# Patient Record
Sex: MALE | Race: WHITE | Employment: OTHER | URBAN - METROPOLITAN AREA
[De-identification: names, ages, dates, MRNs, and addresses within clinical notes are randomized per-mention and may not be internally consistent; named-entity substitution may affect disease eponyms.]

---

## 2019-01-17 ENCOUNTER — HOSPITAL ENCOUNTER (OUTPATIENT)
Age: 64
Discharge: HOME | End: 2019-01-19

## 2019-01-17 PROCEDURE — 88305 TISSUE EXAM BY PATHOLOGIST: CPT

## 2020-02-07 ENCOUNTER — APPOINTMENT (OUTPATIENT)
Dept: GENERAL RADIOLOGY | Age: 65
End: 2020-02-07
Payer: COMMERCIAL

## 2020-02-07 ENCOUNTER — APPOINTMENT (OUTPATIENT)
Dept: CT IMAGING | Age: 65
End: 2020-02-07
Payer: COMMERCIAL

## 2020-02-07 ENCOUNTER — HOSPITAL ENCOUNTER (EMERGENCY)
Age: 65
Discharge: HOME | End: 2020-02-07
Attending: EMERGENCY MEDICINE
Payer: COMMERCIAL

## 2020-02-07 VITALS
RESPIRATION RATE: 16 BRPM | TEMPERATURE: 97.5 F | OXYGEN SATURATION: 96 % | HEART RATE: 97 BPM | SYSTOLIC BLOOD PRESSURE: 125 MMHG | DIASTOLIC BLOOD PRESSURE: 76 MMHG

## 2020-02-07 LAB
ALBUMIN SERPL-MCNC: 4.2 G/DL (ref 3.5–5.2)
ALP BLD-CCNC: 146 U/L (ref 40–129)
ALT SERPL-CCNC: 25 U/L (ref 0–40)
ANION GAP SERPL CALCULATED.3IONS-SCNC: 11 MMOL/L (ref 7–16)
AST SERPL-CCNC: 17 U/L (ref 0–39)
BASOPHILS ABSOLUTE: 0 E9/L (ref 0–0.2)
BASOPHILS RELATIVE PERCENT: 0.5 % (ref 0–2)
BILIRUB SERPL-MCNC: 0.5 MG/DL (ref 0–1.2)
BUN BLDV-MCNC: 18 MG/DL (ref 8–23)
CALCIUM SERPL-MCNC: 11 MG/DL (ref 8.6–10.2)
CHLORIDE BLD-SCNC: 102 MMOL/L (ref 98–107)
CO2: 24 MMOL/L (ref 22–29)
CREAT SERPL-MCNC: 1.3 MG/DL (ref 0.7–1.2)
EOSINOPHILS ABSOLUTE: 0 E9/L (ref 0.05–0.5)
EOSINOPHILS RELATIVE PERCENT: 0.8 % (ref 0–6)
GFR AFRICAN AMERICAN: >60
GFR NON-AFRICAN AMERICAN: 55 ML/MIN/1.73
GLUCOSE BLD-MCNC: 110 MG/DL (ref 74–99)
HCT VFR BLD CALC: 47.6 % (ref 37–54)
HEMOGLOBIN: 14.8 G/DL (ref 12.5–16.5)
LACTIC ACID: 1.4 MMOL/L (ref 0.5–2.2)
LIPASE: 40 U/L (ref 13–60)
LYMPHOCYTES ABSOLUTE: 0.33 E9/L (ref 1.5–4)
LYMPHOCYTES RELATIVE PERCENT: 3.5 % (ref 20–42)
MCH RBC QN AUTO: 27.6 PG (ref 26–35)
MCHC RBC AUTO-ENTMCNC: 31.1 % (ref 32–34.5)
MCV RBC AUTO: 88.8 FL (ref 80–99.9)
MONOCYTES ABSOLUTE: 0.75 E9/L (ref 0.1–0.95)
MONOCYTES RELATIVE PERCENT: 8.7 % (ref 2–12)
NEUTROPHILS ABSOLUTE: 7.3 E9/L (ref 1.8–7.3)
NEUTROPHILS RELATIVE PERCENT: 87.8 % (ref 43–80)
PDW BLD-RTO: 13.7 FL (ref 11.5–15)
PLATELET # BLD: 351 E9/L (ref 130–450)
PMV BLD AUTO: 9.2 FL (ref 7–12)
POTASSIUM REFLEX MAGNESIUM: 4.5 MMOL/L (ref 3.5–5)
RBC # BLD: 5.36 E12/L (ref 3.8–5.8)
SODIUM BLD-SCNC: 137 MMOL/L (ref 132–146)
TOTAL PROTEIN: 8.1 G/DL (ref 6.4–8.3)
TROPONIN: <0.01 NG/ML (ref 0–0.03)
WBC # BLD: 8.3 E9/L (ref 4.5–11.5)

## 2020-02-07 PROCEDURE — 80053 COMPREHEN METABOLIC PANEL: CPT

## 2020-02-07 PROCEDURE — 84484 ASSAY OF TROPONIN QUANT: CPT

## 2020-02-07 PROCEDURE — 2580000003 HC RX 258: Performed by: EMERGENCY MEDICINE

## 2020-02-07 PROCEDURE — 71250 CT THORAX DX C-: CPT

## 2020-02-07 PROCEDURE — 83690 ASSAY OF LIPASE: CPT

## 2020-02-07 PROCEDURE — 93005 ELECTROCARDIOGRAM TRACING: CPT | Performed by: NURSE PRACTITIONER

## 2020-02-07 PROCEDURE — 71120 X-RAY EXAM BREASTBONE 2/>VWS: CPT

## 2020-02-07 PROCEDURE — 99285 EMERGENCY DEPT VISIT HI MDM: CPT

## 2020-02-07 PROCEDURE — 83605 ASSAY OF LACTIC ACID: CPT

## 2020-02-07 PROCEDURE — 36415 COLL VENOUS BLD VENIPUNCTURE: CPT

## 2020-02-07 PROCEDURE — 74176 CT ABD & PELVIS W/O CONTRAST: CPT

## 2020-02-07 PROCEDURE — 85025 COMPLETE CBC W/AUTO DIFF WBC: CPT

## 2020-02-07 PROCEDURE — 71110 X-RAY EXAM RIBS BIL 3 VIEWS: CPT

## 2020-02-07 PROCEDURE — 74018 RADEX ABDOMEN 1 VIEW: CPT

## 2020-02-07 RX ORDER — 0.9 % SODIUM CHLORIDE 0.9 %
1000 INTRAVENOUS SOLUTION INTRAVENOUS ONCE
Status: COMPLETED | OUTPATIENT
Start: 2020-02-07 | End: 2020-02-07

## 2020-02-07 RX ADMIN — SODIUM CHLORIDE 1000 ML: 9 INJECTION, SOLUTION INTRAVENOUS at 20:35

## 2020-02-07 ASSESSMENT — PAIN SCALES - GENERAL: PAINLEVEL_OUTOF10: 7

## 2020-02-08 LAB
EKG ATRIAL RATE: 106 BPM
EKG P AXIS: 55 DEGREES
EKG P-R INTERVAL: 188 MS
EKG Q-T INTERVAL: 322 MS
EKG QRS DURATION: 88 MS
EKG QTC CALCULATION (BAZETT): 427 MS
EKG R AXIS: 130 DEGREES
EKG T AXIS: 32 DEGREES
EKG VENTRICULAR RATE: 106 BPM

## 2020-02-08 PROCEDURE — 93010 ELECTROCARDIOGRAM REPORT: CPT | Performed by: INTERNAL MEDICINE

## 2020-02-08 NOTE — ED PROVIDER NOTES
0.8 0.0 - 6.0 %    Basophils % 0.5 0.0 - 2.0 %    Neutrophils Absolute 7.30 1.80 - 7.30 E9/L    Lymphocytes Absolute 0.33 (L) 1.50 - 4.00 E9/L    Monocytes Absolute 0.75 0.10 - 0.95 E9/L    Eosinophils Absolute 0.00 (L) 0.05 - 0.50 E9/L    Basophils Absolute 0.00 0.00 - 0.20 E9/L   Troponin   Result Value Ref Range    Troponin <0.01 0.00 - 0.03 ng/mL   Lactic Acid, Plasma   Result Value Ref Range    Lactic Acid 1.4 0.5 - 2.2 mmol/L   Lipase   Result Value Ref Range    Lipase 40 13 - 60 U/L   EKG 12 Lead   Result Value Ref Range    Ventricular Rate 106 BPM    Atrial Rate 106 BPM    P-R Interval 188 ms    QRS Duration 88 ms    Q-T Interval 322 ms    QTc Calculation (Bazett) 427 ms    P Axis 55 degrees    R Axis 130 degrees    T Axis 32 degrees       RADIOLOGY:  Interpreted by Radiologist.  CT Chest WO Contrast   Final Result   1. Areas of linear subsegmental atelectasis in both bases off   undetermined age. 2. Loss of height of several thoracic vertebral bodies of undetermined   age. There is no previous studies available for comparison. 3. Please correlated initially with the clinical examination. See   above comments. CT ABDOMEN PELVIS WO CONTRAST   Final Result   1. No indication for an acute trauma injury to the intraperitoneal   retroperitoneal structures of the abdomen and pelvis. 2. No acute fractures seen in the lumbar spine, pelvic bones including   hip joints and sacral spine. 3.A right inguinal hernia is observed the.      4. Small nonobstructing calculus in the lower pole of the right   kidney. 5. A focus of sclerosis in the L3 vertebral body and in the right   pubic bone. See above comments and recommendations. XR STERNUM (MIN 2 VIEWS)   Final Result   Although cannot see conspicuously an acute displaced   fracture of the sternal there are some limitation the present study.    Can further evaluate with a CT scan of the chest.      XR ABDOMEN (KUB) (SINGLE AP VIEW)

## 2020-11-02 ENCOUNTER — OFFICE VISIT (OUTPATIENT)
Dept: PODIATRY | Age: 65
End: 2020-11-02
Payer: COMMERCIAL

## 2020-11-02 VITALS — WEIGHT: 170 LBS | TEMPERATURE: 98 F | BODY MASS INDEX: 25.76 KG/M2 | HEIGHT: 68 IN

## 2020-11-02 PROBLEM — M79.672 LEFT FOOT PAIN: Status: ACTIVE | Noted: 2020-11-02

## 2020-11-02 PROBLEM — M21.612 BUNION, LEFT FOOT: Status: ACTIVE | Noted: 2020-11-02

## 2020-11-02 PROBLEM — M20.41 HAMMER TOES OF BOTH FEET: Status: ACTIVE | Noted: 2020-11-02

## 2020-11-02 PROBLEM — M20.42 HAMMER TOES OF BOTH FEET: Status: ACTIVE | Noted: 2020-11-02

## 2020-11-02 PROBLEM — I73.9 PVD (PERIPHERAL VASCULAR DISEASE) (HCC): Status: ACTIVE | Noted: 2020-11-02

## 2020-11-02 PROCEDURE — 4040F PNEUMOC VAC/ADMIN/RCVD: CPT | Performed by: PODIATRIST

## 2020-11-02 PROCEDURE — G8427 DOCREV CUR MEDS BY ELIG CLIN: HCPCS | Performed by: PODIATRIST

## 2020-11-02 PROCEDURE — 3017F COLORECTAL CA SCREEN DOC REV: CPT | Performed by: PODIATRIST

## 2020-11-02 PROCEDURE — 1036F TOBACCO NON-USER: CPT | Performed by: PODIATRIST

## 2020-11-02 PROCEDURE — 1123F ACP DISCUSS/DSCN MKR DOCD: CPT | Performed by: PODIATRIST

## 2020-11-02 PROCEDURE — 99203 OFFICE O/P NEW LOW 30 MIN: CPT | Performed by: PODIATRIST

## 2020-11-02 PROCEDURE — G8417 CALC BMI ABV UP PARAM F/U: HCPCS | Performed by: PODIATRIST

## 2020-11-02 PROCEDURE — G8484 FLU IMMUNIZE NO ADMIN: HCPCS | Performed by: PODIATRIST

## 2020-11-02 NOTE — PROGRESS NOTES
Patient is here today for evaluation of left foot pain. He states 1 year ago he had plantar wart laser removal and feels like he is walking on a rock. He c/o left bunion pain and hammer toe 2nd left toe.

## 2020-11-02 NOTE — PROGRESS NOTES
20     Lillie Medellin    : 1955 Sex: male   Age: 77 y.o. Patient was referred by: None  Patient's PCP/Provider is: Leanna Ferrer MD    Subjective:    Patient is seen today for evaluation regarding pain into both feet. Chief Complaint   Patient presents with    Toe Pain    Foot Pain       HPI: Patient has had issues for several years and they have progressively gotten worse. Patient also complaining of some bunion and hammertoe issues to both feet. Patient did have a previous benign neoplasm removed plantar left forefoot region which is causing recurrent symptoms at this time. He denies any recent injury or change in activities. Patient wanted to discuss potential treatment options available at this time. ROS:  Const: Positives and pertinent negatives as per HPI. Musculo: Denies symptoms other than stated above. Neuro: Denies symptoms other than stated above. Skin: Denies symptoms other than stated above. Current Medications:    Current Outpatient Medications:     nortriptyline (PAMELOR) 25 MG capsule, Take 75 mg by mouth nightly, Disp: , Rfl:     citalopram (CELEXA) 20 MG tablet, Take 40 mg by mouth daily, Disp: , Rfl:     Mirabegron ER (MYRBETRIQ) 25 MG TB24, Take 25 mg by mouth daily, Disp: , Rfl:     tamsulosin (FLOMAX) 0.4 MG capsule, Take 0.8 mg by mouth daily, Disp: , Rfl:     baclofen (LIORESAL) 10 MG tablet, Take 10 mg by mouth 2 times daily, Disp: , Rfl:     Allergies: Allergies   Allergen Reactions    Seasonal Other (See Comments)       Vitals:    20 1057   Temp: 98 °F (36.7 °C)   Weight: 170 lb (77.1 kg)   Height: 5' 8\" (1.727 m)        Past Medical History:   Diagnosis Date    Movement disorder     MS (multiple sclerosis) ([facility] [address]      No family history on file. No past surgical history on file.   Social History     Tobacco Use    Smoking status: Never Smoker    Smokeless tobacco: Never Used   Substance Use Topics    Alcohol use: Yes     Comment: social    Drug use: No           Diagnostic studies:    Xr Foot Left (min 3 Views)    Result Date: 11/2/2020  EXAMINATION: THREE XRAY VIEWS OF THE LEFT FOOT 11/2/2020 10:37 am COMPARISON: None. HISTORY: ORDERING SYSTEM PROVIDED HISTORY: Left foot pain TECHNOLOGIST PROVIDED HISTORY: Standing unless patient unable to stand FINDINGS: No acute fracture or dislocation. Bunion at the 1st MTP joint and mild hammertoe deformities at the 2nd through 5th digits. No soft tissue swelling or edema. Chronic changes bunion and hammertoe as described above. No acute abnormality in the left foot. Procedures:    None    Exam:  VASCULAR: Pedal pulses palpable but diminished to palpation bilateral foot. Capillary fill time delayed digits 1 through 5 bilateral foot. Presence of hair growth is diminished to both lower extremities. Coolness noted to palpation digital regions bilateral foot. NEUROLOGICAL: Epicritic sensations intact and symmetrical  DERMATOLOGICAL: Ruborous skin changes noted to both lower extremities. No plantar ulcerations or heel fissuring noted bilateral foot. Mild hyperkeratotic area noted plantar left third MTPJ region without underlying ulceration or infection noted. MUSCULOSKELETAL: Moderate bunion deformity noted left greater than right. Contraction deformities noted lesser digits bilateral foot. Adequate range of motion ankle and subtalar joint noted bilateral lower extremities. Plan Per Assessment  Chely Telles was seen today for toe pain and foot pain. Diagnoses and all orders for this visit:    PVD (peripheral vascular disease) ([facility] [address]  -     VL LOWER EXTREMITY ARTERIAL SEGMENTAL PRESSURES W PPG; Future    Left foot pain  -     XR FOOT LEFT (MIN 3 VIEWS); Future  -     VL LOWER EXTREMITY ARTERIAL SEGMENTAL PRESSURES W PPG; Future    Bunion, left foot    Hammer toes of both feet    MS (multiple sclerosis) ([facility])    Difficulty walking        1. New patient evaluation and management  2.  We

## 2020-11-11 ENCOUNTER — TELEPHONE (OUTPATIENT)
Dept: PODIATRY | Age: 65
End: 2020-11-11

## 2020-11-11 NOTE — TELEPHONE ENCOUNTER
Pt calling has not heard anything regarding test he is suppose to have done. It has been 2 wks.  Please call pt  [phone]

## 2020-11-24 ENCOUNTER — HOSPITAL ENCOUNTER (OUTPATIENT)
Dept: INTERVENTIONAL RADIOLOGY/VASCULAR | Age: 65
Discharge: HOME | End: 2020-11-26
Payer: COMMERCIAL

## 2020-11-24 PROCEDURE — 93923 UPR/LXTR ART STDY 3+ LVLS: CPT

## 2020-12-03 ENCOUNTER — TELEPHONE (OUTPATIENT)
Dept: VASCULAR SURGERY | Age: 65
End: 2020-12-03

## 2020-12-03 ENCOUNTER — OFFICE VISIT (OUTPATIENT)
Dept: PODIATRY | Age: 65
End: 2020-12-03
Payer: COMMERCIAL

## 2020-12-03 VITALS — WEIGHT: 170 LBS | HEIGHT: 68 IN | BODY MASS INDEX: 25.76 KG/M2

## 2020-12-03 PROCEDURE — 1036F TOBACCO NON-USER: CPT | Performed by: PODIATRIST

## 2020-12-03 PROCEDURE — 4040F PNEUMOC VAC/ADMIN/RCVD: CPT | Performed by: PODIATRIST

## 2020-12-03 PROCEDURE — 99213 OFFICE O/P EST LOW 20 MIN: CPT | Performed by: PODIATRIST

## 2020-12-03 PROCEDURE — 1123F ACP DISCUSS/DSCN MKR DOCD: CPT | Performed by: PODIATRIST

## 2020-12-03 PROCEDURE — G8427 DOCREV CUR MEDS BY ELIG CLIN: HCPCS | Performed by: PODIATRIST

## 2020-12-03 PROCEDURE — G8417 CALC BMI ABV UP PARAM F/U: HCPCS | Performed by: PODIATRIST

## 2020-12-03 PROCEDURE — G8484 FLU IMMUNIZE NO ADMIN: HCPCS | Performed by: PODIATRIST

## 2020-12-03 PROCEDURE — 3017F COLORECTAL CA SCREEN DOC REV: CPT | Performed by: PODIATRIST

## 2020-12-03 NOTE — TELEPHONE ENCOUNTER
Received a referral from Dr. Carissa Gomez for PVD, left message for patient to schedule appointment with Dr. Shanique Parker.

## 2020-12-03 NOTE — PROGRESS NOTES
12/3/20     Lalit Gonzales    : 1955   Sex: male    Age: 77 y.o. Patient's PCP/Provider is: Cole Perez MD    Subjective:  Patient is seen today for follow-up regarding continued treatment regarding pain into his left foot and digital regions. Patient presents today to discuss his vascular results. Patient still having issues with certain activities into his left foot and ankle region. He denies any recent injury or change in activities. No other additional abnormalities noted. Chief Complaint   Patient presents with    Follow-up     Vascular results        ROS:  Const: Positives and pertinent negatives as per HPI. Musculo: Denies symptoms other than stated above. Neuro: Denies symptoms other than stated above. Skin: Denies symptoms other than stated above. Current Medications:    Current Outpatient Medications:     nortriptyline (PAMELOR) 25 MG capsule, Take 75 mg by mouth nightly, Disp: , Rfl:     citalopram (CELEXA) 20 MG tablet, Take 40 mg by mouth daily, Disp: , Rfl:     Mirabegron ER (MYRBETRIQ) 25 MG TB24, Take 25 mg by mouth daily, Disp: , Rfl:     tamsulosin (FLOMAX) 0.4 MG capsule, Take 0.8 mg by mouth daily, Disp: , Rfl:     baclofen (LIORESAL) 10 MG tablet, Take 10 mg by mouth 2 times daily, Disp: , Rfl:     Allergies: Allergies   Allergen Reactions    Seasonal Other (See Comments)       Vitals:    20 1021   Weight: 170 lb (77.1 kg)   Height: 5' 8\" (1.727 m)       Exam:  Neurovascular status unchanged. Tenderness still noted into the lesser digital regions left foot with attempted range of motion and muscle testing performed. Mild dependent ruborous changes noted to the left foot. No ulcerations or any signs of infection noted left foot. No maceration the webspaces noted left foot. The motion of the lesser digits does elicit discomfort into the digital and forefoot regions left.       Diagnostic Studies:     Vl Lower Extremity Arterial Segmental Pressures W Ppg    Result Date: 11/24/2020  EXAMINATION: ARTERIAL DUPLEX ULTRASOUND OF THE BILATERAL LOWER EXTREMITIES WITH SEGMENTAL PRESSURES AND PULSE VOLUME RECORDINGS 11/24/2020 1:39 pm TECHNIQUE: Arterial duplex IRAM ultrasound. Segmental pressures and PVR performed with Doppler. COMPARISON: None. HISTORY: ORDERING SYSTEM PROVIDED HISTORY: PVD (peripheral vascular disease) ([facility] [address] TECHNOLOGIST PROVIDED HISTORY: Reason for exam:->Peripheral vascular disease What reading provider will be dictating this exam?->CRC FINDINGS: The IRAM on the right is 1.1. The IRAM on the left is 1.1. Doppler waveforms are triphasic throughout both lower extremities. PVR waveforms or within the arm limits throughout the bilateral thighs and calves. Diminutive PVR waveforms are noted at the left 3rd through 5th digits. No evidence of significant stenosis in the major arteries of the lower extremities. Findings suggestive of microvascular disease in the left foot. Procedures:    None    Plan Per Assessment  Pavan Garcia was seen today for follow-up. Diagnoses and all orders for this visit:    PVD (peripheral vascular disease) ([facility] [address]  -     Ambulatory referral to Vascular Surgery    Pain in both lower extremities  -     Ambulatory referral to Vascular Surgery    Hammer toes of both feet    Bunion, left foot    MS (multiple sclerosis) ([facility] [address]      1. Evaluation and management  2. Did review the vascular studies with patient in detail today, and due to the abnormal study results we did recommend vascular consultation. Patient was in favor of this course of treatment. 3. We did discuss appropriate shoe gear to wear at all times to prevent irritative changes to the digital and forefoot regions bilaterally. 4. Patient will be followed up at a later date after his vascular consultation is performed to discuss additional treatment options regarding his Podiatry concerns.   He was advised to call the office with any questions or concerns in the interim. Seen By:    Gabby Mccarty DPM    Electronically signed by Gabby Mccarty DPM on 12/3/2020 at 2:01 PM    This note was created using voice recognition software. The note was reviewed however may contain grammatical errors.

## 2020-12-21 ENCOUNTER — OFFICE VISIT (OUTPATIENT)
Dept: VASCULAR SURGERY | Age: 65
End: 2020-12-21
Payer: COMMERCIAL

## 2020-12-21 VITALS
SYSTOLIC BLOOD PRESSURE: 110 MMHG | WEIGHT: 160 LBS | BODY MASS INDEX: 24.25 KG/M2 | RESPIRATION RATE: 16 BRPM | DIASTOLIC BLOOD PRESSURE: 60 MMHG | HEIGHT: 68 IN

## 2020-12-21 PROCEDURE — 99203 OFFICE O/P NEW LOW 30 MIN: CPT | Performed by: SURGERY

## 2020-12-21 PROCEDURE — 1123F ACP DISCUSS/DSCN MKR DOCD: CPT | Performed by: SURGERY

## 2020-12-21 PROCEDURE — G8420 CALC BMI NORM PARAMETERS: HCPCS | Performed by: SURGERY

## 2020-12-21 PROCEDURE — G8484 FLU IMMUNIZE NO ADMIN: HCPCS | Performed by: SURGERY

## 2020-12-21 PROCEDURE — 3017F COLORECTAL CA SCREEN DOC REV: CPT | Performed by: SURGERY

## 2020-12-21 PROCEDURE — 4040F PNEUMOC VAC/ADMIN/RCVD: CPT | Performed by: SURGERY

## 2020-12-21 PROCEDURE — G8427 DOCREV CUR MEDS BY ELIG CLIN: HCPCS | Performed by: SURGERY

## 2020-12-21 PROCEDURE — 1036F TOBACCO NON-USER: CPT | Performed by: SURGERY

## 2020-12-21 RX ORDER — DIMETHYL FUMARATE 240 MG/1
240 CAPSULE ORAL 2 TIMES DAILY
COMMUNITY

## 2020-12-21 RX ORDER — ASPIRIN 81 MG/1
81 TABLET ORAL DAILY
COMMUNITY

## 2020-12-21 NOTE — PATIENT INSTRUCTIONS
Patient Education        Learning About Peripheral Arterial Disease of the Legs  What is peripheral arterial disease? Peripheral arterial disease (PAD) is narrowing or blockage of arteries in your arms and legs. The most common cause of PAD is the buildup of plaque on the inside of arteries. Plaque is made of extra cholesterol, calcium, and other material in your blood. Over time, plaque builds up in the walls of the arteries, including those that supply blood to your legs. This buildup leads to poor blood flow. When you have PAD, you have a risk of having plaque in other arteries in your body. This raises your risk of a heart attack and stroke. This information focuses on peripheral arterial disease of the legs, the area where it is most common. When you have PAD of the legs and you walk or exercise, your leg muscles may not get enough blood. This may cause symptoms, such as leg pain during exercise. Peripheral arterial disease is also called peripheral vascular disease. What are the symptoms? Many people who have PAD do not have any symptoms. But if you have symptoms, you may have weak or tired legs, difficulty walking or balancing, or pain. If you have pain, you might feel a tight, aching, or squeezing pain in the calf, thigh, or buttock. This pain usually happens after you have walked a certain distance. The pain goes away if you stop walking. This pain is called intermittent claudication. If PAD gets worse, you may have other symptoms that are caused by poor blood flow to your legs and feet. You may have:  · Cold or numb feet or toes. · Sores that are slow to heal.  · Leg or foot pain while you are at rest.  · Feet and toes that become pale from exercise or when elevated. · Feet that turn red when dangled. · Blue or purple marks on your legs, feet, or toes. How can you prevent PAD? · Quit smoking. Quitting smoking is one of the best things you can do to help prevent PAD.  If you need help quitting, talk to your doctor about stop-smoking programs and medicines. These can increase your chances of quitting for good. · Stay at a healthy weight. · Manage other health problems, including diabetes, high blood pressure, and high cholesterol. · Be physically active. Get at least 30 minutes of exercise on most days of the week. Walking is a good choice. You also may want to do other activities, such as running, swimming, cycling, or playing tennis or team sports. · Eat a variety of heart-healthy foods. ? Eat fruits, vegetables, whole grains (like oatmeal), dried beans and peas, nuts and seeds, soy products (like tofu), and fat-free or low-fat dairy products. ? Replace butter, margarine, and hydrogenated or partially hydrogenated oils with olive and canola oils. (Canola oil margarine without trans fat is fine.)  ? Replace red meat with fish, poultry, and soy protein (like tofu). ? Limit processed and packaged foods like chips, crackers, and cookies. How is PAD treated? Your doctor may suggest ways to relieve symptoms and lower your risk of heart attack and stroke. These may include:  · Quitting smoking. It's one of the most important things you can do. If you need help quitting, talk to your doctor about stop-smoking programs and medicines. These can increase your chances of quitting for good. · Eating heart-healthy foods. · Staying at a healthy weight. Lose weight if you need to. · Regular exercise (if your doctor says it's safe). Try walking, swimming, or biking for at least 30 minutes on most, if not all, days of the week. If you have leg symptoms when you exercise, your doctor might recommend a specialized exercise program that may relieve symptoms. The goal is to be able to walk farther without pain. · Medicines that help manage other problems such as high blood pressure and high cholesterol.   · Medicine, such as aspirin, that prevents blood clots which could cause a heart attack or stroke. · Procedures, such as opening narrowed or blocked arteries (angioplasty) or using healthy blood vessels to create detours around narrowed or blocked arteries (bypass surgery). Follow-up care is a key part of your treatment and safety. Be sure to make and go to all appointments, and call your doctor if you are having problems. It's also a good idea to know your test results and keep a list of the medicines you take. Where can you learn more? Go to https://Lekiosque.fr.Jobmetoo. org and sign in to your [employer] account. Enter D193 in the Empressr box to learn more about \"Learning About Peripheral Arterial Disease of the Legs. \"     If you do not have an account, please click on the \"Sign Up Now\" link. Current as of: December 16, 2019               Content Version: 12.6  © 8342-9338 [employer]. Care instructions adapted under license by [facility] ([facility]). If you have questions about a medical condition or this instruction, always ask your healthcare professional. [place] [address] any warranty or liability for your use of this information.

## 2020-12-21 NOTE — PROGRESS NOTES
Vascular Surgery Outpatient Consultation    Reason for Consult:  pvd    PCP : Gaudencio Atkinson MD  Podiatrist : Dr. Tyler Moore:    The patient is a 77 y.o. male who states is here in regards to pvd. There are plans in the future for left first toe surgery. He has been having problems with balance because of the bunion. Currently he is not having pain. He has significant issues with numbness and tingling in bilateral LE. He has chronic issues due to his MS. He denies lifestyle limiting claudication, rest pain or tissue loss. ROS : All others Negative if blank [], Positive if [x]  General Urinary   [] Fevers [] Hematuria   [] Chills [] Dysuria   [] Weight Loss Vascular   Skin [] Claudication   [] Tissue Loss [] Rest Pain   Eyes Neurologic   [x] Wears Glasses/Contacts [] Stroke/TIA   [] Vision Changes [] Focal weakness   Respiratory [] Slurred Speech    [] Shortness of breath ENT   Cardiovascular [] Difficulty swallowing   [] Chest Pain Endocrine    [] Shortness of breath with exertion [] Increased Thirst   Gastrointestinal    [] Abdominal Pain    [] Melena   [] Hematochezia         Past Medical History:        Diagnosis Date    Bunion     Movement disorder     MS (multiple sclerosis) ([facility] [address]      Past Surgical History:        Procedure Laterality Date    EYE SURGERY      glucoma    PROSTATE SURGERY       Current Medications:   Current Outpatient Medications   Medication Sig Dispense Refill    dimethyl fumarate (TECFIDERA) 240 MG delayed release capsule Take 240 mg by mouth 2 times daily      aspirin 81 MG EC tablet Take 81 mg by mouth daily      nortriptyline (PAMELOR) 25 MG capsule Take 75 mg by mouth nightly      citalopram (CELEXA) 20 MG tablet Take 40 mg by mouth daily       No current facility-administered medications for this visit.       Allergies:  Seasonal  Social History     Socioeconomic History    Marital status:      Spouse name: Not on file  Number of children: Not on file    Years of education: Not on file    Highest education level: Not on file   Occupational History    Not on file   Social Needs    Financial resource strain: Not on file    Food insecurity     Worry: Not on file     Inability: Not on file    Transportation needs     Medical: Not on file     Non-medical: Not on file   Tobacco Use    Smoking status: Never Smoker    Smokeless tobacco: Never Used   Substance and Sexual Activity    Alcohol use: Yes     Comment: social    Drug use: No    Sexual activity: Not on file   Lifestyle    Physical activity     Days per week: Not on file     Minutes per session: Not on file    Stress: Not on file   Relationships    Social connections     Talks on phone: Not on file     Gets together: Not on file     Attends [employer] service: Not on file     Active member of club or organization: Not on file     Attends meetings of clubs or organizations: Not on file     Relationship status: Not on file    Intimate partner violence     Fear of current or ex partner: Not on file     Emotionally abused: Not on file     Physically abused: Not on file     Forced sexual activity: Not on file   Other Topics Concern    Not on file   Social History Narrative    Not on file     Family Hx  Denies hx of PVD    Labs  Lab Results   Component Value Date    WBC 8.3 02/07/2020    HGB 14.8 02/07/2020    HCT 47.6 02/07/2020     02/07/2020    PROTIME 12.2 12/18/2016    INR 1.1 12/18/2016    K 4.5 02/07/2020    BUN 18 02/07/2020    CREATININE 1.3 (H) 02/07/2020     PHYSICAL EXAM:    /60   Resp 16   Ht 5' 8\" (1.727 m)   Wt 160 lb (72.6 kg)   BMI 24.33 kg/m²   CONSTITUTIONAL:   Awake, alert, cooperative  PSYCHIATRIC :  Oriented to time, place and person     Appropriate insight to disease process  EYES: Lids and lashes normal  ENT:  External ears and nose without lesions   Hearing deficits not noted  NECK: Supple, symmetrical, trachea midline   Thyroid goiter not appreciated   Carotid bruit notnoted  LUNGS:  No increased work of breathing                 Clear to auscultation  CARDIOVASCULAR:  regular rate and rhythm   ABDOMEN:  soft, non-distended, non-tender   Hernias umbilical, non tender, no skin changes, reducible   Aorta is not palpable  Lymphatics : Cervical lymphadenopathy notnotnoed     Femoral lymphadenopathy notnoted  SKIN:   Normal skin color   Texture and turgor normal, no induration  EXTREMITIES:   R UE 5/5 strength   No cyanosis noted in nail beds  L UE 5/5 strength   No cyanosis noted in nail beds  R LE Edema absent   No open wounds  L LE Edema absent  No open wounds  R femoral 2+ L femoral 2+   R dorsalis pedis 2+ L dorsalis pedis 2+   R posterior tibial biphasic L posterior tibial 1+     RADIOLOGY:  PIs and PVRs  Right IRAM 1.13, TBI noncompressible  Left IRAM 1.09, TBI noncompressible      A/P Assx PVD  · He has very mild at worst PVD -mostly in the tibial distribution  · His PVD is asymptomatic  · He does have diminished toe pressures on the left side and the third fourth and fifth toe but normal toe pressures in the first toe  · He has adequate flow for healing -okay to go forward with podiatric intervention from a vascular point of view  · Continue Medical management with [facility]SA  · Discussed with pt tobacco use and significant relationship to PVD    pt currently is not a smoker   Pt understands tobacco uses potential to cause increased problems post intervention, future worsening of disease, and even limb loss  · Walking Program  · Emphasized importance of foot care  · They understood to call if develops any wounds or ulcerations, changes in  appearance or symptoms  · If any issues with healing Dr. Shahid Yo will contact me    Rebecca Serra

## 2020-12-22 ENCOUNTER — OFFICE VISIT (OUTPATIENT)
Dept: PODIATRY | Age: 65
End: 2020-12-22
Payer: COMMERCIAL

## 2020-12-22 VITALS — WEIGHT: 160 LBS | HEIGHT: 68 IN | BODY MASS INDEX: 24.25 KG/M2

## 2020-12-22 PROCEDURE — 1036F TOBACCO NON-USER: CPT | Performed by: PODIATRIST

## 2020-12-22 PROCEDURE — 99213 OFFICE O/P EST LOW 20 MIN: CPT | Performed by: PODIATRIST

## 2020-12-22 PROCEDURE — G8420 CALC BMI NORM PARAMETERS: HCPCS | Performed by: PODIATRIST

## 2020-12-22 PROCEDURE — G8484 FLU IMMUNIZE NO ADMIN: HCPCS | Performed by: PODIATRIST

## 2020-12-22 PROCEDURE — 3017F COLORECTAL CA SCREEN DOC REV: CPT | Performed by: PODIATRIST

## 2020-12-22 PROCEDURE — 1123F ACP DISCUSS/DSCN MKR DOCD: CPT | Performed by: PODIATRIST

## 2020-12-22 PROCEDURE — 4040F PNEUMOC VAC/ADMIN/RCVD: CPT | Performed by: PODIATRIST

## 2020-12-22 PROCEDURE — G8427 DOCREV CUR MEDS BY ELIG CLIN: HCPCS | Performed by: PODIATRIST

## 2020-12-22 NOTE — PROGRESS NOTES
Patient is in today to discuss surgery options after being cleared by Dr. Meg Salamanca is Tarsha Carlson MD

## 2020-12-23 NOTE — PROGRESS NOTES
20     Cerdick Stain    : 1955   Sex: male    Age: 77 y.o. Patient's PCP/Provider is: Rosemary Mobley MD    Subjective:  Patient is seen today for follow-up regarding continued evaluation regarding bunion and hammertoe issues left foot. Patient did have his vascular consultation and was noted to have adequate flow to both lower extremities. Patient wanted to discuss continued treatment options regarding his hammertoe and bunion issues left. No other additional abnormalities noted at this time. Chief Complaint   Patient presents with    Follow-up     discuss surgery        ROS:  Const: Positives and pertinent negatives as per HPI. Musculo: Denies symptoms other than stated above. Neuro: Denies symptoms other than stated above. Skin: Denies symptoms other than stated above. Current Medications:    Current Outpatient Medications:     dimethyl fumarate (TECFIDERA) 240 MG delayed release capsule, Take 240 mg by mouth 2 times daily, Disp: , Rfl:     aspirin 81 MG EC tablet, Take 81 mg by mouth daily, Disp: , Rfl:     nortriptyline (PAMELOR) 25 MG capsule, Take 75 mg by mouth nightly, Disp: , Rfl:     citalopram (CELEXA) 20 MG tablet, Take 40 mg by mouth daily, Disp: , Rfl:     Allergies: Allergies   Allergen Reactions    Seasonal Other (See Comments)       Vitals:    20 1308   Weight: 160 lb (72.6 kg)   Height: 5' 8\" (1.727 m)       Exam:  Neurovascular status unchanged. Bunion issue noted which is fixed in nature. Hammertoe contraction deformities noted which are rigid in nature left foot secondary to patient's MS issues. No significant hyperkeratotic areas noted to the digital regions or plantar ball of the foot regions left foot. Antalgic gait noted upon evaluation.       Diagnostic Studies:     Vl Lower Extremity Arterial Segmental Pressures W Ppg    Result Date: 2020  EXAMINATION: ARTERIAL DUPLEX ULTRASOUND OF THE BILATERAL LOWER EXTREMITIES WITH SEGMENTAL PRESSURES AND PULSE VOLUME RECORDINGS 11/24/2020 1:39 pm TECHNIQUE: Arterial duplex IRAM ultrasound. Segmental pressures and PVR performed with Doppler. COMPARISON: None. HISTORY: ORDERING SYSTEM PROVIDED HISTORY: PVD (peripheral vascular disease) ([facility] [address] TECHNOLOGIST PROVIDED HISTORY: Reason for exam:->Peripheral vascular disease What reading provider will be dictating this exam?->CRC FINDINGS: The IRAM on the right is 1.1. The IRAM on the left is 1.1. Doppler waveforms are triphasic throughout both lower extremities. PVR waveforms or within the arm limits throughout the bilateral thighs and calves. Diminutive PVR waveforms are noted at the left 3rd through 5th digits. No evidence of significant stenosis in the major arteries of the lower extremities. Findings suggestive of microvascular disease in the left foot. Procedures:    None    Plan Per Assessment  Jimenes Ro was seen today for follow-up. Diagnoses and all orders for this visit:    Joey Friend, left foot  -     Amb External Referral For Orthotics    Hammer toes of both feet  -     Amb External Referral For Orthotics    Left foot pain  -     Amb External Referral For Orthotics    MS (multiple sclerosis) ([facility] [address]    Difficulty walking  -     Amb External Referral For Orthotics      1. Evaluation and management  2. We did review previous x-rays and did discuss vascular consultation results. 3. We did recommend conservative care at this time with use of a custom orthotic to help control the bunion and hammertoe issues left foot. Patient was in agreement with this course of care. 4. Patient will be followed up in 6 weeks time or sooner if needed for reevaluation. He was advised to call the office with any questions or concerns in the interim. Seen By:    Lisa Vásquez DPM    Electronically signed by Lisa Vásquez DPM on 12/23/2020 at 3:27 PM    This note was created using voice recognition software.   The note was reviewed however may

## 2021-02-11 ENCOUNTER — OFFICE VISIT (OUTPATIENT)
Dept: PODIATRY | Age: 66
End: 2021-02-11
Payer: COMMERCIAL

## 2021-02-11 VITALS — HEIGHT: 68 IN | WEIGHT: 160 LBS | BODY MASS INDEX: 24.25 KG/M2

## 2021-02-11 DIAGNOSIS — M20.41 HAMMER TOES OF BOTH FEET: ICD-10-CM

## 2021-02-11 DIAGNOSIS — R26.2 DIFFICULTY WALKING: ICD-10-CM

## 2021-02-11 DIAGNOSIS — G35 MS (MULTIPLE SCLEROSIS) (HCC): Chronic | ICD-10-CM

## 2021-02-11 DIAGNOSIS — M20.42 HAMMER TOES OF BOTH FEET: ICD-10-CM

## 2021-02-11 DIAGNOSIS — M21.612 BUNION, LEFT FOOT: Primary | ICD-10-CM

## 2021-02-11 PROCEDURE — G8484 FLU IMMUNIZE NO ADMIN: HCPCS | Performed by: PODIATRIST

## 2021-02-11 PROCEDURE — 3017F COLORECTAL CA SCREEN DOC REV: CPT | Performed by: PODIATRIST

## 2021-02-11 PROCEDURE — G8427 DOCREV CUR MEDS BY ELIG CLIN: HCPCS | Performed by: PODIATRIST

## 2021-02-11 PROCEDURE — G8420 CALC BMI NORM PARAMETERS: HCPCS | Performed by: PODIATRIST

## 2021-02-11 PROCEDURE — 1123F ACP DISCUSS/DSCN MKR DOCD: CPT | Performed by: PODIATRIST

## 2021-02-11 PROCEDURE — 99213 OFFICE O/P EST LOW 20 MIN: CPT | Performed by: PODIATRIST

## 2021-02-11 PROCEDURE — 1036F TOBACCO NON-USER: CPT | Performed by: PODIATRIST

## 2021-02-11 PROCEDURE — 4040F PNEUMOC VAC/ADMIN/RCVD: CPT | Performed by: PODIATRIST

## 2021-02-11 NOTE — PROGRESS NOTES
21     Otf Martinez    : 1955   Sex: male    Age: 77 y.o. Patient's PCP/Provider is: Eloisa Mccabe MD    Subjective:  Patient is seen today for follow-up regarding continued evaluation regarding bunion issue and hammertoe issue left foot. Patient just recently did  his custom foot orthoses which she has been wearing over the last 2 weeks. Patient stated he does not wear his tennis shoes all the time and wanted to discuss other potential treatment options that he might be able to wear at home instead of his slippers. He denies any additional issues at this time. Chief Complaint   Patient presents with    Follow-up     bilateral orthotic insoles       ROS:  Const: Positives and pertinent negatives as per HPI. Musculo: Denies symptoms other than stated above. Neuro: Denies symptoms other than stated above. Skin: Denies symptoms other than stated above. Current Medications:    Current Outpatient Medications:     dimethyl fumarate (TECFIDERA) 240 MG delayed release capsule, Take 240 mg by mouth 2 times daily, Disp: , Rfl:     aspirin 81 MG EC tablet, Take 81 mg by mouth daily, Disp: , Rfl:     nortriptyline (PAMELOR) 25 MG capsule, Take 75 mg by mouth nightly, Disp: , Rfl:     citalopram (CELEXA) 20 MG tablet, Take 40 mg by mouth daily, Disp: , Rfl:     Allergies: Allergies   Allergen Reactions    Seasonal Other (See Comments)       Vitals:    21 1152   Weight: 160 lb (72.6 kg)   Height: 5' 8\" (1.727 m)       Exam:  Neurovascular status unchanged. Bunion and hammertoe sites left foot are stable at this time. No plantar calluses noted left ball of the foot region. No hyperkeratotic areas noted lesser digits PIPJ regions left foot. Minimal tenderness noted into the ball of the foot regions left foot. Mild antalgic gait noted left foot. Diagnostic Studies:     No results found.       Procedures:    None     Plan Per Assessment  Anastasia Bruno was seen today for follow-up. Diagnoses and all orders for this visit:    Lina Holland, left foot    Hammer toes of both feet    MS (multiple sclerosis) ([address]    Difficulty walking      1. Evaluation and management  2. We did discuss and evaluated patient's current foot orthoses. We did recommend purchasing new supportive shoe gear to accommodate the insoles and relieve current issues. 3. We did give patient information on purchasing OTC orthopedic sandals to be worn at home to give him added support and protection into the ball of the foot regions bilaterally. Patient was advised on daily exercise regimen and strengthening and stretching exercises to reduce symptoms as well. 4. Patient will be followed up in 2 months time or sooner if needed for reevaluation. He was advised to call the office with any questions or concerns in the interim. Seen By:    Edgardo Kinsey DPM    Electronically signed by Edgardo Kinsey DPM on 2/11/2021 at 12:03 PM    This note was created using voice recognition software. The note was reviewed however may contain grammatical errors.

## 2021-02-11 NOTE — PROGRESS NOTES
Patient is here for follow up with orthotic insoles. Patient has a few questions about how long he should wear them.  Last ov with Mary Jc MD 12/25/2020    Electronically signed by Mary Alice Hameed LPN on 1/76/5647 at 34:99 AM

## 2021-02-22 ENCOUNTER — TELEPHONE (OUTPATIENT)
Dept: PODIATRY | Age: 66
End: 2021-02-22

## 2021-02-22 NOTE — TELEPHONE ENCOUNTER
Spoke with patient and guided him through the [employer] website in order to purchase in pinnacle plus insoles.         Electronically signed by Eugene Gibbs MA on 2/22/2021 at 10:21 AM

## 2021-02-22 NOTE — TELEPHONE ENCOUNTER
Orquidea Cruz called in to state he cannot find the correct pennicle series on [employer] online and would like some one to walk him through it. He stated Zainab Campos showed him in the office on the computer and he cant find that.  Best contact [phone]

## 2021-05-18 ENCOUNTER — OFFICE VISIT (OUTPATIENT)
Dept: PODIATRY | Age: 66
End: 2021-05-18
Payer: COMMERCIAL

## 2021-05-18 VITALS — HEIGHT: 68 IN | BODY MASS INDEX: 24.25 KG/M2 | WEIGHT: 160 LBS

## 2021-05-18 DIAGNOSIS — M20.42 HAMMER TOES OF BOTH FEET: ICD-10-CM

## 2021-05-18 DIAGNOSIS — M21.612 BUNION, LEFT FOOT: Primary | ICD-10-CM

## 2021-05-18 DIAGNOSIS — M20.41 HAMMER TOES OF BOTH FEET: ICD-10-CM

## 2021-05-18 DIAGNOSIS — R26.2 DIFFICULTY WALKING: ICD-10-CM

## 2021-05-18 DIAGNOSIS — G35 MS (MULTIPLE SCLEROSIS) (HCC): Chronic | ICD-10-CM

## 2021-05-18 PROCEDURE — 1123F ACP DISCUSS/DSCN MKR DOCD: CPT | Performed by: PODIATRIST

## 2021-05-18 PROCEDURE — G8420 CALC BMI NORM PARAMETERS: HCPCS | Performed by: PODIATRIST

## 2021-05-18 PROCEDURE — 3017F COLORECTAL CA SCREEN DOC REV: CPT | Performed by: PODIATRIST

## 2021-05-18 PROCEDURE — 4040F PNEUMOC VAC/ADMIN/RCVD: CPT | Performed by: PODIATRIST

## 2021-05-18 PROCEDURE — 99213 OFFICE O/P EST LOW 20 MIN: CPT | Performed by: PODIATRIST

## 2021-05-18 PROCEDURE — 1036F TOBACCO NON-USER: CPT | Performed by: PODIATRIST

## 2021-05-18 PROCEDURE — G8427 DOCREV CUR MEDS BY ELIG CLIN: HCPCS | Performed by: PODIATRIST

## 2021-05-19 NOTE — PROGRESS NOTES
Patient is in today for 2 month follow up. Patient does not notice any change since receiving his orthotics. Patient is not having any pain at this time.  pcp is Inessa Dow MD  Last ov 3/17/21
insoles. 3. Patient was advised daily stretching strengthening exercises to prevent worsening of his digital and bunion issues. 4. Patient will be followed up at a later date for continued evaluation and care. Seen By:    Niesha William DPM    Electronically signed by Niesha William DPM on 5/18/2021 at 9:24 PM    This note was created using voice recognition software. The note was reviewed however may contain grammatical errors.

## 2024-02-05 LAB
ALBUMIN: NORMAL
ALP BLD-CCNC: NORMAL U/L
ALT SERPL-CCNC: NORMAL U/L
ANION GAP SERPL CALCULATED.3IONS-SCNC: NORMAL MMOL/L
AST SERPL-CCNC: NORMAL U/L
BASOPHILS ABSOLUTE: NORMAL
BASOPHILS RELATIVE PERCENT: NORMAL
BILIRUB SERPL-MCNC: NORMAL MG/DL
BUN BLDV-MCNC: NORMAL MG/DL
CALCIUM SERPL-MCNC: NORMAL MG/DL
CHLORIDE BLD-SCNC: NORMAL MMOL/L
CHOLESTEROL, TOTAL: NORMAL
CHOLESTEROL/HDL RATIO: NORMAL
CO2: NORMAL
CREAT SERPL-MCNC: NORMAL MG/DL
EOSINOPHILS ABSOLUTE: NORMAL
EOSINOPHILS RELATIVE PERCENT: NORMAL
GFR, ESTIMATED: NORMAL
GLUCOSE BLD-MCNC: NORMAL MG/DL
HCT VFR BLD CALC: NORMAL %
HDLC SERPL-MCNC: NORMAL MG/DL
HEMOGLOBIN: NORMAL
LDL CHOLESTEROL: NORMAL
LYMPHOCYTES ABSOLUTE: NORMAL
LYMPHOCYTES RELATIVE PERCENT: NORMAL
MCH RBC QN AUTO: NORMAL PG
MCHC RBC AUTO-ENTMCNC: NORMAL G/DL
MCV RBC AUTO: NORMAL FL
MONOCYTES ABSOLUTE: NORMAL
MONOCYTES RELATIVE PERCENT: NORMAL
NEUTROPHILS ABSOLUTE: NORMAL
NEUTROPHILS RELATIVE PERCENT: NORMAL
NONHDLC SERPL-MCNC: NORMAL MG/DL
PLATELET # BLD: NORMAL 10*3/UL
PMV BLD AUTO: NORMAL FL
POTASSIUM SERPL-SCNC: NORMAL MMOL/L
PROSTATE SPECIFIC ANTIGEN: NORMAL
RBC # BLD: NORMAL 10*6/UL
SODIUM BLD-SCNC: NORMAL MMOL/L
TOTAL PROTEIN: NORMAL
TRIGL SERPL-MCNC: NORMAL MG/DL
VLDLC SERPL CALC-MCNC: NORMAL MG/DL
WBC # BLD: NORMAL 10*3/UL

## 2025-02-25 ENCOUNTER — OFFICE VISIT (OUTPATIENT)
Age: 70
End: 2025-02-25

## 2025-02-25 VITALS
WEIGHT: 177 LBS | SYSTOLIC BLOOD PRESSURE: 128 MMHG | OXYGEN SATURATION: 99 % | TEMPERATURE: 97.7 F | HEIGHT: 68 IN | RESPIRATION RATE: 18 BRPM | BODY MASS INDEX: 26.83 KG/M2 | DIASTOLIC BLOOD PRESSURE: 78 MMHG | HEART RATE: 75 BPM

## 2025-02-25 DIAGNOSIS — Z12.5 PROSTATE CANCER SCREENING: ICD-10-CM

## 2025-02-25 DIAGNOSIS — Z00.00 MEDICARE ANNUAL WELLNESS VISIT, SUBSEQUENT: Primary | ICD-10-CM

## 2025-02-25 DIAGNOSIS — E78.2 MIXED HYPERLIPIDEMIA: ICD-10-CM

## 2025-02-25 DIAGNOSIS — G35 MS (MULTIPLE SCLEROSIS) (HCC): ICD-10-CM

## 2025-02-25 DIAGNOSIS — Z12.11 COLON CANCER SCREENING: ICD-10-CM

## 2025-02-25 RX ORDER — OXYBUTYNIN CHLORIDE 5 MG/1
5 TABLET ORAL DAILY
COMMUNITY

## 2025-02-25 SDOH — ECONOMIC STABILITY: FOOD INSECURITY: WITHIN THE PAST 12 MONTHS, THE FOOD YOU BOUGHT JUST DIDN'T LAST AND YOU DIDN'T HAVE MONEY TO GET MORE.: NEVER TRUE

## 2025-02-25 SDOH — ECONOMIC STABILITY: FOOD INSECURITY: WITHIN THE PAST 12 MONTHS, YOU WORRIED THAT YOUR FOOD WOULD RUN OUT BEFORE YOU GOT MONEY TO BUY MORE.: NEVER TRUE

## 2025-02-25 ASSESSMENT — PATIENT HEALTH QUESTIONNAIRE - PHQ9
SUM OF ALL RESPONSES TO PHQ QUESTIONS 1-9: 0
2. FEELING DOWN, DEPRESSED OR HOPELESS: NOT AT ALL
SUM OF ALL RESPONSES TO PHQ QUESTIONS 1-9: 0
SUM OF ALL RESPONSES TO PHQ9 QUESTIONS 1 & 2: 0
SUM OF ALL RESPONSES TO PHQ QUESTIONS 1-9: 0
1. LITTLE INTEREST OR PLEASURE IN DOING THINGS: NOT AT ALL
SUM OF ALL RESPONSES TO PHQ QUESTIONS 1-9: 0

## 2025-02-25 ASSESSMENT — LIFESTYLE VARIABLES
HOW MANY STANDARD DRINKS CONTAINING ALCOHOL DO YOU HAVE ON A TYPICAL DAY: PATIENT DOES NOT DRINK
HOW OFTEN DO YOU HAVE A DRINK CONTAINING ALCOHOL: NEVER
HOW MANY STANDARD DRINKS CONTAINING ALCOHOL DO YOU HAVE ON A TYPICAL DAY: PATIENT DOES NOT DRINK

## 2025-02-25 NOTE — PROGRESS NOTES
Medicare Annual Wellness Visit    Yoan Boyer is here for Medicare AWV (Pt states he would like order for lab work.Discuss Gait. )    Assessment & Plan   Medicare annual wellness visit, subsequent  Colon cancer screening  -     External Referral To Colorectal Surgery  MS (multiple sclerosis) (HCC)  Mixed hyperlipidemia  -     Comprehensive Metabolic Panel, Fasting; Future  -     CBC with Auto Differential; Future  -     Lipid, Fasting; Future  Prostate cancer screening  -     PSA Screening; Future   He will continue gait and muscle strengthening.  He will get some outpatient labs will refer him for a colonoscopy consultation from 2014 if he does not hear from Dr. Prieto in 2 weeks he is to call us and we will make sure that is set up he will continue his meds from them and I will see him back again yearly unless he needs me earlier I think it is imperative that he exercises at home on a daily basis specially with some light weights in his arms and  Return for Medicare Annual Wellness Visit in 1 year.     Subjective     Patient comes in today for yearly wellness visit the patient is doing quite well he continues to follow Dr. Henderson at [facility] for his MS.  He is on all of his medications we reviewed those.  He politely declined a flu shot or pneumonia shot or shingles vaccine he is due for a colonoscopy as of last year will tatiana set him up with Dr. Messi Coelho we will get an outpatient metabolic panel lipid panel CBC and a PSA level.  And we talked about getting more active once the letter starts to improve.  In the next month or so.  Patient's complete Health Risk Assessment and screening values have been reviewed and are found in Flowsheets. The following problems were reviewed today and where indicated follow up appointments were made and/or referrals ordered.    Positive Risk Factor Screenings with Interventions:                   Vision Screen:  Do you have difficulty driving, watching TV, or doing any

## 2025-04-11 ENCOUNTER — TELEPHONE (OUTPATIENT)
Age: 70
End: 2025-04-11

## 2025-04-11 DIAGNOSIS — R74.8 ELEVATED ALKALINE PHOSPHATASE LEVEL: Primary | ICD-10-CM

## 2025-04-11 NOTE — TELEPHONE ENCOUNTER
SIGN ORDER    Spoke to pt regarding Alkaline phosphatase and mailing lab order to pt to repeat in 6 weeks.  Pt told to continue diet and meds for elevated cholesterol.

## 2025-06-02 ENCOUNTER — TELEPHONE (OUTPATIENT)
Age: 70
End: 2025-06-02

## 2025-06-02 RX ORDER — TADALAFIL 5 MG/1
5 TABLET ORAL PRN
COMMUNITY

## 2025-06-02 NOTE — TELEPHONE ENCOUNTER
He is on Cialis 5mg now and wanted you to be aware of it. He was put on it from a doctor on the internet he said.    He stopped nortriptyline, he weaned himself off through the [facility]. He is no longer having pain.

## 2025-06-03 ENCOUNTER — HOSPITAL ENCOUNTER (OUTPATIENT)
Age: 70
Discharge: HOME | End: 2025-06-05

## 2025-06-09 LAB — SURGICAL PATHOLOGY REPORT: NORMAL

## 2025-07-16 ENCOUNTER — TELEPHONE (OUTPATIENT)
Age: 70
End: 2025-07-16

## 2025-07-16 NOTE — TELEPHONE ENCOUNTER
Patient's wife called to ask for a letter to get his handicap placard renewed.    We will call her when done.  [phone]    We also need to verify his Cell #.  We have 2 different ones

## 2025-08-05 DIAGNOSIS — L23.9 ALLERGIC DERMATITIS: Primary | ICD-10-CM

## 2025-08-05 RX ORDER — TRIAMCINOLONE ACETONIDE 1 MG/G
CREAM TOPICAL
Qty: 15 G | Refills: 0 | Status: SHIPPED | OUTPATIENT
Start: 2025-08-05